# Patient Record
Sex: MALE | Race: WHITE | NOT HISPANIC OR LATINO | URBAN - METROPOLITAN AREA
[De-identification: names, ages, dates, MRNs, and addresses within clinical notes are randomized per-mention and may not be internally consistent; named-entity substitution may affect disease eponyms.]

---

## 2022-11-18 ENCOUNTER — APPOINTMENT (RX ONLY)
Dept: URBAN - METROPOLITAN AREA CLINIC 148 | Facility: CLINIC | Age: 62
Setting detail: DERMATOLOGY
End: 2022-11-18

## 2022-11-18 DIAGNOSIS — L57.0 ACTINIC KERATOSIS: ICD-10-CM

## 2022-11-18 PROCEDURE — 17000 DESTRUCT PREMALG LESION: CPT

## 2022-11-18 PROCEDURE — ? ADDITIONAL NOTES

## 2022-11-18 PROCEDURE — ? LIQUID NITROGEN

## 2022-11-18 PROCEDURE — ? COUNSELING

## 2022-11-18 ASSESSMENT — LOCATION SIMPLE DESCRIPTION DERM: LOCATION SIMPLE: LEFT WRIST

## 2022-11-18 ASSESSMENT — LOCATION DETAILED DESCRIPTION DERM: LOCATION DETAILED: LEFT DORSAL WRIST

## 2022-11-18 ASSESSMENT — LOCATION ZONE DERM: LOCATION ZONE: ARM

## 2022-11-18 NOTE — PROCEDURE: ADDITIONAL NOTES
Additional Notes: Advised if the site does not resolve with LN2 to come back for a Bx. Pt states he understands.
Render Risk Assessment In Note?: yes
Detail Level: Detailed

## 2022-12-20 ENCOUNTER — APPOINTMENT (RX ONLY)
Dept: URBAN - METROPOLITAN AREA CLINIC 148 | Facility: CLINIC | Age: 62
Setting detail: DERMATOLOGY
End: 2022-12-20

## 2022-12-20 DIAGNOSIS — L57.0 ACTINIC KERATOSIS: ICD-10-CM

## 2022-12-20 PROCEDURE — 17000 DESTRUCT PREMALG LESION: CPT

## 2022-12-20 PROCEDURE — ? LIQUID NITROGEN

## 2022-12-20 PROCEDURE — ? ADDITIONAL NOTES

## 2022-12-20 PROCEDURE — ? COUNSELING

## 2022-12-20 ASSESSMENT — LOCATION DETAILED DESCRIPTION DERM: LOCATION DETAILED: LEFT DORSAL WRIST

## 2022-12-20 ASSESSMENT — LOCATION ZONE DERM: LOCATION ZONE: ARM

## 2022-12-20 ASSESSMENT — LOCATION SIMPLE DESCRIPTION DERM: LOCATION SIMPLE: LEFT WRIST

## 2022-12-20 NOTE — PROCEDURE: ADDITIONAL NOTES
Additional Notes: Site improved. Advised if area returns pt will need Biopsy. Pt is going back to [place], he has a derm we advised him to make an appointment for an evaluation. Pt stated he understood.
Render Risk Assessment In Note?: yes
Detail Level: Detailed